# Patient Record
Sex: FEMALE | Race: WHITE | NOT HISPANIC OR LATINO | Employment: OTHER | URBAN - METROPOLITAN AREA
[De-identification: names, ages, dates, MRNs, and addresses within clinical notes are randomized per-mention and may not be internally consistent; named-entity substitution may affect disease eponyms.]

---

## 2018-09-07 NOTE — PATIENT DISCUSSION
PHOTOGRAPHS: I have reviewed the external ocular photographs of this patient which show the following: significant ptosis right upper eyelid.

## 2018-10-25 NOTE — PATIENT DISCUSSION
Also, please do not hesitate to call us if you have any concerns not addressed by this information. Please call [phone] and we will do everything we can to help you during this period. negative...

## 2022-05-25 ENCOUNTER — NEW PATIENT (OUTPATIENT)
Dept: URBAN - METROPOLITAN AREA CLINIC 35 | Facility: CLINIC | Age: 57
End: 2022-05-25

## 2022-05-25 DIAGNOSIS — H52.03: ICD-10-CM

## 2022-05-25 PROCEDURE — 92004 COMPRE OPH EXAM NEW PT 1/>: CPT

## 2022-05-25 PROCEDURE — 92015 DETERMINE REFRACTIVE STATE: CPT

## 2022-05-25 ASSESSMENT — VISUAL ACUITY
OU_SC: J8
OD_SC: 20/25
OD_SC: J10
OS_SC: J12
OS_SC: 20/25
OU_SC: 20/20

## 2022-05-25 ASSESSMENT — TONOMETRY
OD_IOP_MMHG: 15
OS_IOP_MMHG: 15

## 2023-05-30 ENCOUNTER — COMPREHENSIVE EXAM (OUTPATIENT)
Dept: URBAN - METROPOLITAN AREA CLINIC 35 | Facility: CLINIC | Age: 58
End: 2023-05-30

## 2023-05-30 DIAGNOSIS — H52.03: ICD-10-CM

## 2023-05-30 PROCEDURE — 92015 DETERMINE REFRACTIVE STATE: CPT

## 2023-05-30 PROCEDURE — 92014 COMPRE OPH EXAM EST PT 1/>: CPT

## 2023-05-30 ASSESSMENT — VISUAL ACUITY
OD_SC: 20/20-2
OU_SC: J5
OS_SC: 20/25+1

## 2023-05-30 ASSESSMENT — TONOMETRY
OS_IOP_MMHG: 18
OD_IOP_MMHG: 18

## 2024-06-03 ENCOUNTER — PREPPED CHART (OUTPATIENT)
Dept: URBAN - METROPOLITAN AREA CLINIC 35 | Facility: CLINIC | Age: 59
End: 2024-06-03

## 2024-06-05 ENCOUNTER — COMPREHENSIVE EXAM (OUTPATIENT)
Dept: URBAN - METROPOLITAN AREA CLINIC 35 | Facility: CLINIC | Age: 59
End: 2024-06-05

## 2024-06-05 DIAGNOSIS — H52.4: ICD-10-CM

## 2024-06-05 DIAGNOSIS — H52.03: ICD-10-CM

## 2024-06-05 PROCEDURE — 92015 DETERMINE REFRACTIVE STATE: CPT

## 2024-06-05 PROCEDURE — 92014 COMPRE OPH EXAM EST PT 1/>: CPT

## 2024-06-05 ASSESSMENT — VISUAL ACUITY
OD_SC: 20/25-1
OU_CC: J1
OD_CC: J2-
OS_CC: J2
OS_SC: 20/25-1
OU_SC: 20/25-1

## 2024-06-05 ASSESSMENT — TONOMETRY
OS_IOP_MMHG: 17
OD_IOP_MMHG: 20

## 2025-06-05 ENCOUNTER — COMPREHENSIVE EXAM (OUTPATIENT)
Age: 60
End: 2025-06-05

## 2025-06-05 DIAGNOSIS — H52.03: ICD-10-CM

## 2025-06-05 DIAGNOSIS — H52.4: ICD-10-CM

## 2025-06-05 PROCEDURE — 92014 COMPRE OPH EXAM EST PT 1/>: CPT

## 2025-06-05 PROCEDURE — 92015 DETERMINE REFRACTIVE STATE: CPT
